# Patient Record
Sex: MALE | Race: WHITE
[De-identification: names, ages, dates, MRNs, and addresses within clinical notes are randomized per-mention and may not be internally consistent; named-entity substitution may affect disease eponyms.]

---

## 2018-02-13 ENCOUNTER — HOSPITAL ENCOUNTER (OUTPATIENT)
Dept: HOSPITAL 45 - C.MRIBC | Age: 38
Discharge: HOME | End: 2018-02-13
Attending: INTERNAL MEDICINE
Payer: COMMERCIAL

## 2018-02-13 DIAGNOSIS — K61.1: Primary | ICD-10-CM

## 2018-02-13 NOTE — DIAGNOSTIC IMAGING REPORT
Study: MRI pelvis



HISTORY: Perirectal abscess



COMPARISON: CT abdomen and pelvis 2/9/2014



FINDINGS: Signal characteristics of the soft tissue and osseous structures of

the low pelvis are unremarkable. Bowel pattern is unremarkable. Bladder is

midline.



No evidence for mass or collection. All major muscular structures are symmetric.



Perirectal and perianal regions appear unremarkable in terms of signal

character. There is no evidence for abnormal mass or collection. There is no

abnormal infiltrative change of the perirectal fat.



IMPRESSION: Normal study. All pelvic, perianal, and perirectal structures are

unremarkable.







Electronically signed by:  Curtis Park M.D.

2/13/2018 2:20 PM



Dictated Date/Time:  2/13/2018 2:14 PM